# Patient Record
Sex: FEMALE | Race: WHITE | NOT HISPANIC OR LATINO
[De-identification: names, ages, dates, MRNs, and addresses within clinical notes are randomized per-mention and may not be internally consistent; named-entity substitution may affect disease eponyms.]

---

## 2023-02-27 PROBLEM — Z00.00 ENCOUNTER FOR PREVENTIVE HEALTH EXAMINATION: Status: ACTIVE | Noted: 2023-02-27

## 2023-04-11 ENCOUNTER — APPOINTMENT (OUTPATIENT)
Dept: ORTHOPEDIC SURGERY | Facility: CLINIC | Age: 78
End: 2023-04-11
Payer: MEDICARE

## 2023-04-11 DIAGNOSIS — E78.2 MIXED HYPERLIPIDEMIA: ICD-10-CM

## 2023-04-11 DIAGNOSIS — I10 ESSENTIAL (PRIMARY) HYPERTENSION: ICD-10-CM

## 2023-04-11 DIAGNOSIS — Z85.828 PERSONAL HISTORY OF OTHER MALIGNANT NEOPLASM OF SKIN: ICD-10-CM

## 2023-04-11 DIAGNOSIS — M17.12 UNILATERAL PRIMARY OSTEOARTHRITIS, LEFT KNEE: ICD-10-CM

## 2023-04-11 DIAGNOSIS — Z86.018 PERSONAL HISTORY OF OTHER BENIGN NEOPLASM: ICD-10-CM

## 2023-04-11 DIAGNOSIS — M19.90 UNSPECIFIED OSTEOARTHRITIS, UNSPECIFIED SITE: ICD-10-CM

## 2023-04-11 DIAGNOSIS — D49.6 NEOPLASM OF UNSPECIFIED BEHAVIOR OF BRAIN: ICD-10-CM

## 2023-04-11 DIAGNOSIS — K21.9 GASTRO-ESOPHAGEAL REFLUX DISEASE W/OUT ESOPHAGITIS: ICD-10-CM

## 2023-04-11 DIAGNOSIS — E87.6 HYPOKALEMIA: ICD-10-CM

## 2023-04-11 PROCEDURE — 73564 X-RAY EXAM KNEE 4 OR MORE: CPT | Mod: LT

## 2023-04-11 PROCEDURE — 99203 OFFICE O/P NEW LOW 30 MIN: CPT

## 2023-04-11 RX ORDER — MELOXICAM 7.5 MG/1
7.5 TABLET ORAL
Qty: 15 | Refills: 0 | Status: ACTIVE | COMMUNITY
Start: 2023-04-11 | End: 1900-01-01

## 2023-04-11 NOTE — PHYSICAL EXAM
[Left] : left knee [AP] : anteroposterior [Lateral] : lateral [Jordan] : skyline [AP Standing] : anteroposterior standing [Positive] : positive Compa [] : patient ambulates without assistive device [FreeTextEntry8] : \par medial patellofemoral tenderness [de-identified] : medial [facility] [FreeTextEntry9] : mild joint space narrowing, joint space maintained [TWNoteComboBox7] : flexion 110 degrees

## 2023-04-11 NOTE — DISCUSSION/SUMMARY
[Medication Risks Reviewed] : Medication risks reviewed [de-identified] : TKA is not recommended at this time \par Discussed options including physical therapy, anti inflammatories\par Plan is for meloxicam and PT\par Recommend physical therapy to work on thigh strengthening and ROM\par pt swims 1x a week

## 2023-04-11 NOTE — REASON FOR VISIT
[FreeTextEntry2] : Here for Left Knee Pain, a few weeks ago  the Knee became swollen in front and by knee cap

## 2023-04-11 NOTE — HISTORY OF PRESENT ILLNESS
[de-identified] : Date of Injury/Onset:    NKI A few weeks ago  Left knee beame swollen in the fron by kneecap \par  \par Affecting Sleep: N\par Difficulty with stairs:  No \par Difficulty getting in and out of car:  No \par Sit to stand stiffness: NO \par Mechanism of injury:  NKI \par  \par Quality of symptoms: , swelling and ache \par Improves with:     Aleve spray  topical \par  \par \par \par  \par